# Patient Record
Sex: OTHER/UNKNOWN | Race: ASIAN | Employment: STUDENT
[De-identification: names, ages, dates, MRNs, and addresses within clinical notes are randomized per-mention and may not be internally consistent; named-entity substitution may affect disease eponyms.]

---

## 2020-03-16 ENCOUNTER — TRANSCRIBE ORDERS (OUTPATIENT)
Dept: OTHER | Age: 30
End: 2020-03-16

## 2020-03-16 DIAGNOSIS — Z78.9 FEMALE-TO-MALE TRANSGENDER PERSON: Primary | ICD-10-CM

## 2020-03-23 ENCOUNTER — TELEPHONE (OUTPATIENT)
Dept: PLASTIC SURGERY | Facility: CLINIC | Age: 30
End: 2020-03-23

## 2020-03-23 NOTE — TELEPHONE ENCOUNTER
LVM for pt to call back and discuss next steps for top consult  Zenaida Collins  [facility]  Intake and Referral Coordinator

## 2021-04-30 ENCOUNTER — APPOINTMENT (OUTPATIENT)
Dept: GENERAL RADIOLOGY | Facility: CLINIC | Age: 31
End: 2021-04-30
Attending: EMERGENCY MEDICINE
Payer: COMMERCIAL

## 2021-04-30 ENCOUNTER — HOSPITAL ENCOUNTER (EMERGENCY)
Facility: CLINIC | Age: 31
Discharge: HOME | End: 2021-05-01
Attending: EMERGENCY MEDICINE | Admitting: EMERGENCY MEDICINE
Payer: COMMERCIAL

## 2021-04-30 VITALS
OXYGEN SATURATION: 99 % | HEART RATE: 76 BPM | WEIGHT: 150 LBS | TEMPERATURE: 97.5 F | DIASTOLIC BLOOD PRESSURE: 80 MMHG | SYSTOLIC BLOOD PRESSURE: 118 MMHG | RESPIRATION RATE: 18 BRPM

## 2021-04-30 DIAGNOSIS — S93.401A MODERATE RIGHT ANKLE SPRAIN, INITIAL ENCOUNTER: ICD-10-CM

## 2021-04-30 PROCEDURE — 73610 X-RAY EXAM OF ANKLE: CPT | Mod: RT

## 2021-04-30 PROCEDURE — 99283 EMERGENCY DEPT VISIT LOW MDM: CPT

## 2021-05-01 NOTE — ED PROVIDER NOTES
History   Chief Complaint:  Ankle Pain       HPI   Rain Sawyer is a 31 year old adult who is presenting with right ankle pain.    Pt states he fell this morning down 2 stairs after he took a misstep. He denies hitting his head or LOC. He was unable to bear weight right after the injury, but a few hours later after taking tylenol he was able to ambulate a little bit. He was able with help from his girlfriend to get into the car and drive here where he has used a wheel chair while in the ED. Pain is a 6/10 aching on the outside of his ankle. Besides tylenol hasn't tried anything else for pain. Doesn't like to take NSAIDs because it upsets his stomach. Has not injured either one of his ankles before.     Review of Systems   Denies fevers/chills, tingling or numbness in his foot. 7 point ROS of was negative unless noted here or HPI.     Allergies:  No Known Allergies    Medications:  The patient is not on any medications.    Past Medical History:    Depression   GERD  anemia    Family History:    Hypertension   Cancer     Social History:  Pt lives at home with his parents.  He is accompanied to the ED by his girlfriend.     Physical Exam     Patient Vitals for the past 24 hrs:   BP Temp Temp src Pulse Resp SpO2 Weight   04/30/21 2130 118/80 97.5  F (36.4  C) Temporal 76 18 99 % 68 kg (150 lb)       Physical Exam  Constitutional:       General: He is not in acute distress.  HENT:      Head: Normocephalic and atraumatic.      Right Ear: External ear normal.      Left Ear: External ear normal.      Mouth/Throat:      Mouth: Mucous membranes are moist.      Pharynx: Oropharynx is clear.   Eyes:      General: No scleral icterus.     Extraocular Movements: Extraocular movements intact.   Cardiovascular:      Rate and Rhythm: Normal rate and regular rhythm.   Pulmonary:      Effort: Pulmonary effort is normal.      Comments: Anterior listening posts clear.   Abdominal:      General: Abdomen is flat.      Palpations: Abdomen  is soft.      Tenderness: There is no abdominal tenderness.   Musculoskeletal:      Comments: Knee: Full pain-free active ROM of R and L knees.   Ankle&Foot: Full pain-free active and passive ROM of L ankle. R ankle full passive ROM and limited dorsiflexion and plantarflexion due to pain. No pain on eversion. Small effusion along right plantar surface under lateral malleolus. Tenderness along tip of anterior lateral malleolus. Negative ankle anterior drawer sign. Positive talar tilt test. Both anterior drawer and talar tilt assessment limited due to pain. Negative squeeze test.   No pain along the right fifth metatarsal or at the navicular. Able to take 2 steps on exam, antalgic gait.    Skin:     General: Skin is warm.      Capillary Refill: Capillary refill takes less than 2 seconds. PT and DP pulses +2 BL.      Findings: No lesion or rash.   Neurological:      Mental Status: He is alert and oriented to person, place, and time.      Comments: Achilles reflexes 2/4 BL.    Psychiatric:         Mood and Affect: Mood normal.         Behavior: Behavior normal.         Emergency Department Course     Imaging:  Ankle XR, G/E 3 views, right  Final Result  IMPRESSION: Corticated 5 mm calcific density projects off the tip of the right lateral malleolus and may represent an old injury or ununited ossification center. No acute fracture. Normal alignment.  Reading per radiology     Emergency Department Course:    Reviewed:  I reviewed the patient's nursing notes, vitals, past medical records, Care Everywhere.     Assessments:  2300  I performed an exam of the patient as documented above.   2345  I reexamined the patient, explained findings, gave discharge instructions and ED precautions      Disposition:  Discharged to home.      Impression & Plan   Covid-19  Rain Sawyer was evaluated during a global COVID-19 pandemic, which necessitated consideration that the patient might be at risk for infection with the SARS-CoV-2 virus  "that causes COVID-19.   Applicable protocols for evaluation were followed during the patient's care.     Medical Decision Making:  Rain Sawyer is a 31 year old adult who is presenting with right ankle pain after falling down two stairs.    Pt presented with normal vitals in mild pain. His ankle exam was limited due to pain and small effusion, but tenderness along anterior tip of lateral malleolus and positive talar tilt. Squeeze test negative, no signs of high ankle sprain. Likely this is a grade I or II lateral ankle sprain. He is neurovascularly intact. No navicular or 5th metatarsal pain suspicious of fracture. His ankle x ray showed \"corticated 5 mm calcific density projects off the tip of the right lateral malleolus and may represent an old injury or ununited ossification center. No acute fracture. Normal alignment.\" Pt doesn't report old ankle injury, but agree on review of x rays this is likely an old finding. Pt was given crutches and CAM boot and instructed on use. Pt able to ambulate with crutches and notes pain improvement while foot in boot. Discussed pain management at home and RICE. Pt to follow-up with primary care doctor within the next 3-4 days to follow-up on healing of lateral ankle sprain. Pt given discharge instructions and ED return precautions. All questions were answered.       Diagnosis:    ICD-10-CM    1. Moderate right ankle sprain, initial encounter  S93.401A        I was present with the medical student who participated in the service and in the documentation of this note. I have verified the history and personally performed the physical exam and medical decision making, and have verified the content of the note, which accurately reflects my assessment of the patient and the plan of care.  Ayesha Lombardo,   Family Medicine, PGY-1        Scribe Disclosure:  I, Tye Santos, am serving as a scribe at 10:52 PM on 4/30/2021 to document services personally performed by Nakul Dawn MD based " on my observations and the provider's statements to me.         Sylvie Lombardo, DO  Resident  05/01/21 0010

## 2021-05-01 NOTE — ED PROVIDER NOTES
ED ATTENDING PHYSICIAN NOTE:     I evaluated this patient in conjunction with Sylvie Lombardo DO resident physician.  I have participated in the care of the patient and personally performed key elements of the history, exam, and medical decision making.        HPI:     Rain Sawyer is a 31 year old adult who presents with right lateral ankle pain and swelling after tripping and rolling his ankle on the stairs.  No other injuries.  No numbness tingling or weakness.  Pain increases with movement of the ankle and ambulation.         MY EXAM:   Patient is well-appearing resting comfortably.  There is erythema and soft tissue swelling overlying the right lateral malleolus of the ankle with tenderness over the inferior and anterior soft tissues.  There is bony tenderness to the inferior malleolus.  The anterior ankle, foot and medial malleolus is normal.  CMS is intact distally.  Normal capillary refill in the foot.  DP pulses 2+.  No increased pain with squeeze test of the calf.  Calf and knee are otherwise normal.         MEDICAL DECISION MAKING/ASSESSMENT AND PLAN:   Signs and symptoms are consistent with ankle sprain involving likely the ATFL ligament.  There is a well-corticated bony ossification adjacent to the lateral malleolus on radiographs.  No other acute fractures.  Patient denies any prior ankle injuries at this site.  Given the well-corticated nature of this it is less likely to represent an acute fracture.  Patient was placed in a Cam walking boot and provided with crutches to use until his pain improves.  Follow-up with his primary care provider was recommended or orthopedics if not improving.  Close return precautions were provided and he was discharged in stable condition.         DIAGNOSIS:       ICD-10-CM    1. Moderate right ankle sprain, initial encounter  S93.401A        Scribe disclosure:   Tye RING, am serving as a scribe at 10:53 PM on 4/30/2021 to document services personally performed by  Nakul Dawn MD based on my observations and the provider's statements to me.         Nakul Dawn MD  05/01/21 1204

## 2021-05-01 NOTE — DISCHARGE INSTRUCTIONS
You have sprained the ligaments on the outside of your ankle. Your x-rays show evidence of a possible old injury but no acute fracture. Please follow-up with your primary care doctor in the next few days for them to recheck on the healing of your ankle sprain. You can wear the CAM boot until you see them. We gave you crutches to help get around, though once the pain and swelling goes down you can likely walk in the boot without the crutches. Elevated your right leg and use ice to help the swelling. You can take Tylenol for pain as I know you do not like taking NSAIDs like Ibuprofen because it upsets your stomach. Please return to the ED if the pain is uncontrolled, you have a fever, you loose sensation in your foot, or other concerning symptoms.

## 2021-06-14 ENCOUNTER — TELEPHONE (OUTPATIENT)
Dept: PLASTIC SURGERY | Facility: CLINIC | Age: 31
End: 2021-06-14

## 2021-06-14 DIAGNOSIS — F64.0 GENDER DYSPHORIA IN ADOLESCENT AND ADULT: Primary | ICD-10-CM

## 2021-06-14 NOTE — TELEPHONE ENCOUNTER
[facility] :  Care Coordination Note     SITUATION   Pt (Jeremias, he/him) is a 31 year old adult who is receiving support for:  Care Team  .    BACKGROUND     Writer called pt back regarding interest in top surgery consultation. Scheduled pt for 3/1/22.    ASSESSMENT     Surgery              [facility] Assessment  [facility] ([facility] Enrollment: Enrolled  Patient has a therapist: No  Letter of support #1: Requested  Surgery being considered: Yes  Mastectomy: Yes    Pt reports:    No smoking  No diabetes  HRT for 2 years  No previous gender confirming surgeries  No current therapist      PLAN          Nursing Interventions:    [facility] assessment completed    Follow-up plan:    1. Writer to send  resources via mail    2. Obtain ARI Campbell

## 2021-12-02 NOTE — TELEPHONE ENCOUNTER
FUTURE VISIT INFORMATION      FUTURE VISIT INFORMATION:    Date: 3/1/22    Time: 9:00am    Location: [facility]  REFERRAL INFORMATION:    Referring provider:  self    Referring providers clinic:  N/A    Reason for visit/diagnosis  top cosnult    RECORDS REQUESTED FROM:       No recs to collect

## 2022-03-01 ENCOUNTER — PRE VISIT (OUTPATIENT)
Dept: SURGERY | Facility: CLINIC | Age: 32
End: 2022-03-01

## 2025-06-18 ENCOUNTER — TELEPHONE (OUTPATIENT)
Dept: PLASTIC SURGERY | Facility: CLINIC | Age: 35
End: 2025-06-18

## 2025-06-18 NOTE — TELEPHONE ENCOUNTER
[facility] Call Center    Phone Message    May a detailed message be left on voicemail: yes     Reason for Call: Other: Pt requesting to schedule New Bottom Surgery w/ Dr Draper.      Action Taken: Other: gender care     Travel Screening: Not Applicable     Date of Service:

## 2025-06-19 NOTE — CONFIDENTIAL NOTE
[facility] :  Care Coordination Note     SITUATION   Jeremias Sawyer (he/him) is a 35 year old adult who is receiving support for:  new bottom surgery consult requested and Care Team  .    BACKGROUND     Pt is scheduled for a metoidioplasty consult with Dr. Draper on 11/25/25.     Pt is still in the research phase and unsure about phalloplasty vs. Metoidioplasty. Pt said he will do additional research between now and his consult.     ASSESSMENT     Surgery              [facility] Assessment  [facility] [place] Enrollment: (P) Enrolled  Patient has a therapist: (P) Yes  Letter of support #1: (P) Requested  Letter of support #2: (P) Requested  Surgery being considered: (P) Yes  Metoidioplasty: (P) Yes    Pt reports:   No nicotine   Top surgery in 2021  HRT since 2017    PLAN          Nursing Interventions:       Follow-up plan:  1. Decide on bottom procedure and obtain ARI Cortes

## 2025-06-19 NOTE — CONFIDENTIAL NOTE
Writer called pt re: bottom surgery consult request. Pt was in a meeting and will call back directly later today.